# Patient Record
Sex: FEMALE | Race: WHITE
[De-identification: names, ages, dates, MRNs, and addresses within clinical notes are randomized per-mention and may not be internally consistent; named-entity substitution may affect disease eponyms.]

---

## 2021-04-16 ENCOUNTER — HOSPITAL ENCOUNTER (EMERGENCY)
Dept: HOSPITAL 54 - ER | Age: 66
Discharge: HOME | End: 2021-04-16
Payer: SELF-PAY

## 2021-04-16 VITALS — HEIGHT: 65 IN | BODY MASS INDEX: 31.65 KG/M2 | WEIGHT: 190 LBS

## 2021-04-16 VITALS — DIASTOLIC BLOOD PRESSURE: 85 MMHG | SYSTOLIC BLOOD PRESSURE: 149 MMHG

## 2021-04-16 DIAGNOSIS — I10: ICD-10-CM

## 2021-04-16 DIAGNOSIS — R07.89: Primary | ICD-10-CM

## 2021-04-16 DIAGNOSIS — Z79.899: ICD-10-CM

## 2021-04-16 LAB
ALBUMIN SERPL BCP-MCNC: 3.4 G/DL (ref 3.4–5)
ALP SERPL-CCNC: 70 U/L (ref 46–116)
ALT SERPL W P-5'-P-CCNC: 21 U/L (ref 12–78)
AST SERPL W P-5'-P-CCNC: 13 U/L (ref 15–37)
BASOPHILS # BLD AUTO: 0.1 /CMM (ref 0–0.2)
BASOPHILS NFR BLD AUTO: 1.3 % (ref 0–2)
BILIRUB DIRECT SERPL-MCNC: 0.1 MG/DL (ref 0–0.2)
BILIRUB SERPL-MCNC: 0.3 MG/DL (ref 0.2–1)
BUN SERPL-MCNC: 11 MG/DL (ref 7–18)
CALCIUM SERPL-MCNC: 8.5 MG/DL (ref 8.5–10.1)
CHLORIDE SERPL-SCNC: 106 MMOL/L (ref 98–107)
CO2 SERPL-SCNC: 28 MMOL/L (ref 21–32)
CREAT SERPL-MCNC: 0.7 MG/DL (ref 0.6–1.3)
D DIMER PPP FEU-MCNC: 0.89 MG/L(FEU (ref 0.17–0.5)
EOSINOPHIL NFR BLD AUTO: 1.3 % (ref 0–6)
GLUCOSE SERPL-MCNC: 121 MG/DL (ref 74–106)
HCT VFR BLD AUTO: 39 % (ref 33–45)
HGB BLD-MCNC: 12.8 G/DL (ref 11.5–14.8)
LYMPHOCYTES NFR BLD AUTO: 2.1 /CMM (ref 0.8–4.8)
LYMPHOCYTES NFR BLD AUTO: 29 % (ref 20–44)
MCHC RBC AUTO-ENTMCNC: 33 G/DL (ref 31–36)
MCV RBC AUTO: 91 FL (ref 82–100)
MONOCYTES NFR BLD AUTO: 0.6 /CMM (ref 0.1–1.3)
MONOCYTES NFR BLD AUTO: 7.9 % (ref 2–12)
NEUTROPHILS # BLD AUTO: 4.4 /CMM (ref 1.8–8.9)
NEUTROPHILS NFR BLD AUTO: 60.5 % (ref 43–81)
NT-PROBNP SERPL-MCNC: 105 PG/ML (ref 0–125)
PLATELET # BLD AUTO: 239 /CMM (ref 150–450)
POTASSIUM SERPL-SCNC: 3.8 MMOL/L (ref 3.5–5.1)
PROT SERPL-MCNC: 6.8 G/DL (ref 6.4–8.2)
RBC # BLD AUTO: 4.29 MIL/UL (ref 4–5.2)
SODIUM SERPL-SCNC: 143 MMOL/L (ref 136–145)
WBC NRBC COR # BLD AUTO: 7.3 K/UL (ref 4.3–11)

## 2021-04-16 PROCEDURE — 99285 EMERGENCY DEPT VISIT HI MDM: CPT

## 2021-04-16 PROCEDURE — 85378 FIBRIN DEGRADE SEMIQUANT: CPT

## 2021-04-16 PROCEDURE — 93005 ELECTROCARDIOGRAM TRACING: CPT

## 2021-04-16 PROCEDURE — 36415 COLL VENOUS BLD VENIPUNCTURE: CPT

## 2021-04-16 PROCEDURE — 84484 ASSAY OF TROPONIN QUANT: CPT

## 2021-04-16 PROCEDURE — 71045 X-RAY EXAM CHEST 1 VIEW: CPT

## 2021-04-16 PROCEDURE — 83880 ASSAY OF NATRIURETIC PEPTIDE: CPT

## 2021-04-16 PROCEDURE — 85730 THROMBOPLASTIN TIME PARTIAL: CPT

## 2021-04-16 PROCEDURE — 80076 HEPATIC FUNCTION PANEL: CPT

## 2021-04-16 PROCEDURE — 71275 CT ANGIOGRAPHY CHEST: CPT

## 2021-04-16 PROCEDURE — 80048 BASIC METABOLIC PNL TOTAL CA: CPT

## 2021-04-16 PROCEDURE — 85025 COMPLETE CBC W/AUTO DIFF WBC: CPT

## 2021-04-16 RX ADMIN — NITROGLYCERIN ONE GM: 20 OINTMENT TOPICAL at 02:42

## 2021-04-16 RX ADMIN — ACETAMINOPHEN ONE MG: 500 TABLET ORAL at 02:40

## 2021-04-16 RX ADMIN — NITROGLYCERIN ONE MG: 0.4 TABLET, ORALLY DISINTEGRATING SUBLINGUAL at 02:40

## 2021-04-16 NOTE — NUR
PT BIBRA FROM HOME C/O MIDSTERNAL CHEST PAIN X30MIN PTA. PER RA, REC'D 3 SPRAY 
NITRO EN ROUTE. PT AWAKE, Chinese SPEAKING. NOTED HYPERTENSION ON ARRIVAL. 
RESPIRATIONS EVEN AND UNLABORED. NO ACUTE DISTRESS NOTED AT THIS TIME. PLACED 
ON CONTINUOUS CARDIAC MONITOR AND PULSE OX, WILL CONTINUE TO MONITOR

## 2021-04-16 NOTE — NUR
NEHAL (SON) CONTACT INFORMATION: [phone]

DR. MAGALLON SPEAKING WITH SON VIA TELEPHONE REGARDING PLAN OF CARE/TRANSLATION